# Patient Record
Sex: FEMALE | Race: OTHER | NOT HISPANIC OR LATINO | URBAN - METROPOLITAN AREA
[De-identification: names, ages, dates, MRNs, and addresses within clinical notes are randomized per-mention and may not be internally consistent; named-entity substitution may affect disease eponyms.]

---

## 2022-03-28 ENCOUNTER — EMERGENCY (EMERGENCY)
Facility: HOSPITAL | Age: 32
LOS: 1 days | Discharge: HOME | End: 2022-03-28
Admitting: EMERGENCY MEDICINE
Payer: MEDICAID

## 2022-03-28 VITALS
OXYGEN SATURATION: 100 % | RESPIRATION RATE: 16 BRPM | DIASTOLIC BLOOD PRESSURE: 62 MMHG | HEART RATE: 65 BPM | SYSTOLIC BLOOD PRESSURE: 113 MMHG | TEMPERATURE: 97 F

## 2022-03-28 PROCEDURE — 99283 EMERGENCY DEPT VISIT LOW MDM: CPT

## 2022-03-28 NOTE — ED PROVIDER NOTE - CLINICAL SUMMARY MEDICAL DECISION MAKING FREE TEXT BOX
32 y/o F with no significant PMHx presents to the ED with right corneal abrasion with uptake on fluorescein stain.  Pt will be given artificial tears and Erythromycin eye ointment.  Pt was advised on symptoms and will f/u with outpatient ophthalmologist.     t

## 2022-03-28 NOTE — ED PROVIDER NOTE - NSFOLLOWUPCLINICS_GEN_ALL_ED_FT
[facility]  Ophthalmology  [address] [place] [address] [address]  [place] [address]  Phone: [phone]  Fax:   Follow Up Time: 4-6 Days

## 2022-03-28 NOTE — ED ADULT TRIAGE NOTE - CHIEF COMPLAINT QUOTE
Pt. c/o of right eye redness and pain, pt. states it was windy  and something blew into her eye, pt feels foreign object , no drainage. Pt endorses blurry vision, excessive tearing. No PMHx.

## 2022-03-28 NOTE — ED PROVIDER NOTE - NSFOLLOWUPINSTRUCTIONS_ED_ALL_ED_FT
Apply Erythromycin eye ointment to affected eye at every night until symptoms improve    Apply 2-3 drops of artifical tears to the affected eye 3 times a day.     Dominique abrasión corneal es un rasguño o lesión en la cubierta transparente en la parte delantera del pilar (córnea). La córnea es dominique especie de cúpula transparente que protege el pilar y ayuda a fijar la vista. La córnea está formada por varias capas, ruben la capa superficial es svitlana de los tejidos más sensibles del cuerpo. Dominique abrasión corneal puede resultar muy dolorosa.    Si no se trata, puede infectarse y producir dominique úlcera. [place] puede dejar cicatrices. Las cicatrices en la córnea pueden afectar la vista. A veces, las abrasiones pueden volver a aparecer en la misma messi, incluso después de que la lesión original haya cicatrizado.    ¿Cuáles son las causas?    Esta afección puede ser causada por lo siguiente:  •Un golpe o pinchazo en el pilar.    •Dominique sustancia arenosa o irritante (cuerpo extraño) en el pilar.    •Frotarse el pilar en exceso.    •Ojos muy secos.    •Ciertas infecciones oculares.    •Lentes de contacto que no encajan basia o que se usan ila períodos prolongados. También puede lastimarse la córnea cuando se pone o se jose los lentes de contacto.    •Cirugía ocular.    •Ciertos problemas de la córnea pueden aumentar la probabilidad de dominique abrasión corneal.    A veces, la causa es desconocida.    ¿Cuáles son los signos o síntomas?    Los síntomas de esta afección incluyen:  •Dolor en el pilar. El dolor puede empeorar al abrir y cerrar el pilar, o al moverlo.    •Sensación de que tiene algo metido en el pilar.    •Lagrimeo, enrojecimiento y sensibilidad a la sofya.    •Dificultad para mantener los ojos abiertos o imposibilidad de mantenerlos abiertos.    •Visión borrosa.    •Dolor de robert.    ¿Cómo se diagnostica?    Puede consultar a un especialista en afecciones y enfermedades oculares (oftalmólogo). Esta afección se puede diagnosticar en función de los síntomas, antecedentes médicos y un examen ocular.    Antes del examen ocular, es posible que le coloquen gotas anestésicas dentro del pilar. También es posible que le coloquen un tinte con un gotero o con dominique pequeña sammi de papel. Con el tinte, al oftalmólogo le resulta más fácil tania la abrasión cuando le examina el pilar con dominique sofya. El oftalmólogo puede examinarle el pilar a través de un oftalmoscopio (lámpara de hendidura).    ¿Cómo se trata?    El tratamiento puede variar según la causa de la afección, y puede incluir lo siguiente:  •Lavado del pilar.    •Extracción de cualquier cuerpo extraño que se encuentre en el pilar.    •Usar gotas o ungüentos con antibiótico para tratar o prevenir dominique infección.    •Usar dominique gota dilatadora para disminuir la inflamación y el dolor.    •Usar gotas o ungüentos con corticoesteroides para tratar el enrojecimiento, la irritación o la inflamación.    •Aplicar un paño frío y húmedo (compresa fría) o compresa de hielo para aliviar el dolor.    •Winsome analgésicos por boca (por vía oral).    En algunos casos, también podría usarse un parche ocular o lentes de contacto blandos. No debe usarse un parche ocular si la abrasión corneal tuvo relación con el uso de lentes de contacto, ya que puede aumentar la probabilidad de infección en estos ojos.    Siga estas instrucciones en green casa:    Medicamentos     •Use los ungüentos o las gotas oftálmicas tin burt se lo haya indicado el médico.    •Si le recetaron antibiótico en forma de gotas o ungüento, úselo según las indicaciones del médico. No deje de usar el antibiótico aunque comience a sentirse mejor.    •Use los medicamentos de venta ian y los recetados solamente burt se lo haya indicado el médico.    •Pregúntele al médico si el medicamento recetado:  •Hace necesario que evite conducir o usar maquinaria pesada.    •Puede causarle estreñimiento. Es posible que tenga que winsome estas medidas para prevenir o tratar el estreñimiento:  •Beber suficiente líquido burt para mantener la orina de color amarillo pálido.    •Usar medicamentos recetados o de venta ian.    •Consumir alimentos ricos en fibra, burt frijoles, cereales integrales, y frutas y verduras frescas.    •Limitar el consumo de alimentos ricos en grasa y azúcares procesados, burt los alimentos fritos o dulces.    Uso del parche ocular   •Si tiene un parche ocular, úselo según las indicaciones del médico.  •No conduzca ni use maquinaria mientras usa el parche ocular. En estas condiciones no puede juzgar correctamente las distancias.    •Siga las instrucciones del médico acerca de cuándo retirar el parche.    Instrucciones generales     •Pregúntele al médico si puede usar dominique compresa fría en el pilar para aliviar el dolor.    • No se toque ni se frote el pilar. No se lave el pilar.    • No use lentes de contacto hasta que el médico lo autorice.    •Evite la sofya amina y la fatiga ocular.    •Concurra a todas las visitas de seguimiento burt se lo haya indicado el médico. [place] es importante para prevenir infecciones y evitar la pérdida de visión.    Comuníquese con un médico si:    •Continúa con dolor en el pilar y otros síntomas ila más de 2 días.  •Tiene síntomas nuevos, burt empeoramiento del enrojecimiento, lagrimeo o secreción.    •Tiene dominique secreción que le yandel los ojos pegados a la mañana.    •El parche ocular se afloja al punto que puede parpadear.    •Los síntomas vuelven a aparecer después de que la abrasión original haya cicatrizado.    Solicite ayuda inmediatamente si:    •Tiene dolor intenso en el pilar que no mejora con medicamentos.    •Presenta pérdida de la visión.    Resumen    •Dominique abrasión corneal es un rasguño o lesión en la cubierta transparente en la parte delantera del pilar (córnea).    •Es importante recibir tratamiento para la abrasión corneal. Si lorenza problema no se trata, puede afectar la visión.    •Use los ungüentos o las gotas oftálmicas tin burt se lo haya indicado el médico.    •Si tiene un parche ocular, no conduzca ni use maquinaria mientras lo usa. En estas condiciones no puede juzgar correctamente las distancias.    •Informe al médico si brandi síntomas continúan ila más de 2 días.

## 2022-03-28 NOTE — ED PROVIDER NOTE - PATIENT PORTAL LINK FT
You can access the FollowMyHealth Patient Portal offered by [facility] by registering at the following website: [email or web address]. By joining [facility]’s FollowMyHealth portal, you will also be able to view your health information using other applications (apps) compatible with our system.

## 2022-03-28 NOTE — ED PROVIDER NOTE - OBJECTIVE STATEMENT
30 y/o F with no significant PMHx presents to the ED with right eye pain and irritation xtoday.  Pt reports getting dust in her eye and having foreign body sensation.  Pt does endorse rubbing and scratching of her eye.  Pt denies any headache, vision loss, pain with ocular movement, fevers, chills, or URI symptoms. 32 y/o F with no significant PMHx presents to the ED with right eye pain and irritation x today.  Pt reports getting dust in her eye and having foreign body sensation.  Pt does endorse rubbing and scratching of her eye.  Pt denies any headache, vision loss, pain with ocular movement, fevers, chills, or URI symptoms.

## 2024-06-03 NOTE — ED PROVIDER NOTE - ENMT, MLM
N/A Airway patent, Nasal mucosa clear. Mouth with normal mucosa. Throat has no vesicles, no oropharyngeal exudates and uvula is midline.